# Patient Record
Sex: MALE | URBAN - METROPOLITAN AREA
[De-identification: names, ages, dates, MRNs, and addresses within clinical notes are randomized per-mention and may not be internally consistent; named-entity substitution may affect disease eponyms.]

---

## 2021-10-19 ENCOUNTER — APPOINTMENT (RX ONLY)
Dept: URBAN - METROPOLITAN AREA CLINIC 80 | Facility: CLINIC | Age: 31
Setting detail: DERMATOLOGY
End: 2021-10-19

## 2021-10-19 DIAGNOSIS — L57.8 OTHER SKIN CHANGES DUE TO CHRONIC EXPOSURE TO NONIONIZING RADIATION: ICD-10-CM | Status: STABLE

## 2021-10-19 DIAGNOSIS — B07.8 OTHER VIRAL WARTS: ICD-10-CM | Status: INADEQUATELY CONTROLLED

## 2021-10-19 DIAGNOSIS — L85.3 XEROSIS CUTIS: ICD-10-CM | Status: INADEQUATELY CONTROLLED

## 2021-10-19 PROCEDURE — ? PRESCRIPTION

## 2021-10-19 PROCEDURE — 17110 DESTRUCTION B9 LES UP TO 14: CPT

## 2021-10-19 PROCEDURE — 99203 OFFICE O/P NEW LOW 30 MIN: CPT | Mod: 25

## 2021-10-19 PROCEDURE — ? PRESCRIPTION MEDICATION MANAGEMENT

## 2021-10-19 PROCEDURE — ? LIQUID NITROGEN

## 2021-10-19 PROCEDURE — ? COUNSELING

## 2021-10-19 RX ORDER — PHARMACY COMPOUNDING ACCESSORY
EACH MISCELLANEOUS
Qty: 1 | Refills: 1 | Status: ERX | COMMUNITY
Start: 2021-10-19

## 2021-10-19 RX ADMIN — Medication: at 00:00

## 2021-10-19 ASSESSMENT — LOCATION DETAILED DESCRIPTION DERM
LOCATION DETAILED: RIGHT ULNAR DORSAL HAND
LOCATION DETAILED: RIGHT VENTRAL PROXIMAL FOREARM
LOCATION DETAILED: 2ND WEB SPACE LEFT HAND
LOCATION DETAILED: RIGHT DISTAL DORSAL MIDDLE FINGER
LOCATION DETAILED: LEFT ULNAR DORSAL HAND
LOCATION DETAILED: LEFT PROXIMAL DORSAL FOREARM
LOCATION DETAILED: RIGHT PROXIMAL POSTERIOR UPPER ARM
LOCATION DETAILED: LEFT ANTERIOR DISTAL UPPER ARM
LOCATION DETAILED: RIGHT PROXIMAL DORSAL FOREARM
LOCATION DETAILED: LEFT PROXIMAL POSTERIOR UPPER ARM
LOCATION DETAILED: PERIUNGUAL SKIN LEFT THUMB

## 2021-10-19 ASSESSMENT — LOCATION SIMPLE DESCRIPTION DERM
LOCATION SIMPLE: LEFT FOREARM
LOCATION SIMPLE: RIGHT HAND
LOCATION SIMPLE: LEFT UPPER ARM
LOCATION SIMPLE: RIGHT UPPER ARM
LOCATION SIMPLE: RIGHT MIDDLE FINGER
LOCATION SIMPLE: LEFT THUMB
LOCATION SIMPLE: LEFT HAND
LOCATION SIMPLE: RIGHT FOREARM

## 2021-10-19 ASSESSMENT — LOCATION ZONE DERM
LOCATION ZONE: FINGER
LOCATION ZONE: HAND
LOCATION ZONE: ARM

## 2021-10-19 NOTE — PROCEDURE: PRESCRIPTION MEDICATION MANAGEMENT
Detail Level: Generalized
Render In Strict Bullet Format?: No
Initiate Treatment: pharmacy compounding accessory \\nQuantity: 1.0 Unspecified\\nSig: Wart Peel  Apply to affected area at night Fluoroucil 2%, Salicylic Acid 17% = 30 grams Gel base

## 2021-11-09 ENCOUNTER — APPOINTMENT (RX ONLY)
Dept: URBAN - METROPOLITAN AREA CLINIC 80 | Facility: CLINIC | Age: 31
Setting detail: DERMATOLOGY
End: 2021-11-09

## 2021-11-09 DIAGNOSIS — B07.8 OTHER VIRAL WARTS: ICD-10-CM

## 2021-11-09 PROCEDURE — 17110 DESTRUCTION B9 LES UP TO 14: CPT

## 2021-11-09 PROCEDURE — ? LIQUID NITROGEN

## 2021-11-09 PROCEDURE — ? PRESCRIPTION MEDICATION MANAGEMENT

## 2021-11-09 PROCEDURE — ? COUNSELING

## 2021-11-09 ASSESSMENT — LOCATION SIMPLE DESCRIPTION DERM
LOCATION SIMPLE: LEFT HAND
LOCATION SIMPLE: LEFT THUMB
LOCATION SIMPLE: RIGHT MIDDLE FINGER

## 2021-11-09 ASSESSMENT — LOCATION DETAILED DESCRIPTION DERM
LOCATION DETAILED: RIGHT DISTAL DORSAL MIDDLE FINGER
LOCATION DETAILED: 2ND WEB SPACE LEFT HAND
LOCATION DETAILED: PERIUNGUAL SKIN LEFT THUMB

## 2021-11-09 ASSESSMENT — LOCATION ZONE DERM
LOCATION ZONE: HAND
LOCATION ZONE: FINGER